# Patient Record
Sex: MALE | Race: BLACK OR AFRICAN AMERICAN | Employment: FULL TIME | URBAN - METROPOLITAN AREA
[De-identification: names, ages, dates, MRNs, and addresses within clinical notes are randomized per-mention and may not be internally consistent; named-entity substitution may affect disease eponyms.]

---

## 2018-08-05 ENCOUNTER — HOSPITAL ENCOUNTER (EMERGENCY)
Age: 38
Discharge: HOME | End: 2018-08-05
Attending: EMERGENCY MEDICINE
Payer: COMMERCIAL

## 2018-08-05 VITALS
HEART RATE: 90 BPM | DIASTOLIC BLOOD PRESSURE: 104 MMHG | WEIGHT: 145 LBS | HEIGHT: 71 IN | BODY MASS INDEX: 20.3 KG/M2 | OXYGEN SATURATION: 100 % | RESPIRATION RATE: 15 BRPM | SYSTOLIC BLOOD PRESSURE: 136 MMHG | TEMPERATURE: 97.9 F

## 2018-08-05 DIAGNOSIS — F10.920 ALCOHOLIC INTOXICATION WITHOUT COMPLICATION (HCC): ICD-10-CM

## 2018-08-05 DIAGNOSIS — S61.011A LACERATION OF RIGHT THUMB WITHOUT FOREIGN BODY WITHOUT DAMAGE TO NAIL, INITIAL ENCOUNTER: Primary | ICD-10-CM

## 2018-08-05 DIAGNOSIS — R55 VASOVAGAL NEAR-SYNCOPE: ICD-10-CM

## 2018-08-05 LAB
ALBUMIN SERPL-MCNC: 4 G/DL (ref 3.5–5)
ALBUMIN/GLOB SERPL: 1.3 {RATIO} (ref 1.1–2.2)
ALP SERPL-CCNC: 79 U/L (ref 45–117)
ALT SERPL-CCNC: 24 U/L (ref 12–78)
ANION GAP SERPL CALC-SCNC: 7 MMOL/L (ref 5–15)
AST SERPL-CCNC: 19 U/L (ref 15–37)
BASOPHILS # BLD: 0 K/UL (ref 0–0.1)
BASOPHILS NFR BLD: 0 % (ref 0–1)
BILIRUB SERPL-MCNC: 0.7 MG/DL (ref 0.2–1)
BUN SERPL-MCNC: 10 MG/DL (ref 6–20)
BUN/CREAT SERPL: 9 (ref 12–20)
CALCIUM SERPL-MCNC: 8.5 MG/DL (ref 8.5–10.1)
CHLORIDE SERPL-SCNC: 109 MMOL/L (ref 97–108)
CO2 SERPL-SCNC: 24 MMOL/L (ref 21–32)
CREAT SERPL-MCNC: 1.16 MG/DL (ref 0.7–1.3)
DIFFERENTIAL METHOD BLD: ABNORMAL
EOSINOPHIL # BLD: 0 K/UL (ref 0–0.4)
EOSINOPHIL NFR BLD: 0 % (ref 0–7)
ERYTHROCYTE [DISTWIDTH] IN BLOOD BY AUTOMATED COUNT: 13.5 % (ref 11.5–14.5)
ETHANOL SERPL-MCNC: 52 MG/DL
GLOBULIN SER CALC-MCNC: 3.1 G/DL (ref 2–4)
GLUCOSE SERPL-MCNC: 99 MG/DL (ref 65–100)
HCT VFR BLD AUTO: 41.3 % (ref 36.6–50.3)
HGB BLD-MCNC: 13.9 G/DL (ref 12.1–17)
IMM GRANULOCYTES # BLD: 0 K/UL (ref 0–0.04)
IMM GRANULOCYTES NFR BLD AUTO: 0 % (ref 0–0.5)
LYMPHOCYTES # BLD: 1 K/UL (ref 0.8–3.5)
LYMPHOCYTES NFR BLD: 8 % (ref 12–49)
MCH RBC QN AUTO: 31.4 PG (ref 26–34)
MCHC RBC AUTO-ENTMCNC: 33.7 G/DL (ref 30–36.5)
MCV RBC AUTO: 93.2 FL (ref 80–99)
MONOCYTES # BLD: 0.5 K/UL (ref 0–1)
MONOCYTES NFR BLD: 4 % (ref 5–13)
NEUTS SEG # BLD: 11.1 K/UL (ref 1.8–8)
NEUTS SEG NFR BLD: 88 % (ref 32–75)
NRBC # BLD: 0 K/UL (ref 0–0.01)
NRBC BLD-RTO: 0 PER 100 WBC
PLATELET # BLD AUTO: 240 K/UL (ref 150–400)
PMV BLD AUTO: 10.5 FL (ref 8.9–12.9)
POTASSIUM SERPL-SCNC: 4.1 MMOL/L (ref 3.5–5.1)
PROT SERPL-MCNC: 7.1 G/DL (ref 6.4–8.2)
RBC # BLD AUTO: 4.43 M/UL (ref 4.1–5.7)
SODIUM SERPL-SCNC: 140 MMOL/L (ref 136–145)
WBC # BLD AUTO: 12.6 K/UL (ref 4.1–11.1)

## 2018-08-05 PROCEDURE — 99284 EMERGENCY DEPT VISIT MOD MDM: CPT

## 2018-08-05 PROCEDURE — 85025 COMPLETE CBC W/AUTO DIFF WBC: CPT | Performed by: EMERGENCY MEDICINE

## 2018-08-05 PROCEDURE — 75810000293 HC SIMP/SUPERF WND  RPR

## 2018-08-05 PROCEDURE — 80307 DRUG TEST PRSMV CHEM ANLYZR: CPT | Performed by: EMERGENCY MEDICINE

## 2018-08-05 PROCEDURE — 77030031132 HC SUT NYL COVD -A

## 2018-08-05 PROCEDURE — 77030018836 HC SOL IRR NACL ICUM -A

## 2018-08-05 PROCEDURE — 96360 HYDRATION IV INFUSION INIT: CPT

## 2018-08-05 PROCEDURE — 74011250636 HC RX REV CODE- 250/636: Performed by: PHYSICIAN ASSISTANT

## 2018-08-05 PROCEDURE — 36415 COLL VENOUS BLD VENIPUNCTURE: CPT | Performed by: EMERGENCY MEDICINE

## 2018-08-05 PROCEDURE — 80053 COMPREHEN METABOLIC PANEL: CPT | Performed by: EMERGENCY MEDICINE

## 2018-08-05 PROCEDURE — 74011250636 HC RX REV CODE- 250/636: Performed by: EMERGENCY MEDICINE

## 2018-08-05 RX ORDER — LIDOCAINE HYDROCHLORIDE 10 MG/ML
5 INJECTION, SOLUTION EPIDURAL; INFILTRATION; INTRACAUDAL; PERINEURAL ONCE
Status: COMPLETED | OUTPATIENT
Start: 2018-08-05 | End: 2018-08-05

## 2018-08-05 RX ADMIN — SODIUM CHLORIDE 1000 ML: 900 INJECTION, SOLUTION INTRAVENOUS at 14:43

## 2018-08-05 RX ADMIN — LIDOCAINE HYDROCHLORIDE 5 ML: 10 INJECTION, SOLUTION EPIDURAL; INFILTRATION; INTRACAUDAL; PERINEURAL at 16:26

## 2018-08-05 NOTE — LETTER
[address] 
\[facility]\"" EMERGENCY DEPT 
[address] [id number] 
[phone] Work/School Note Date: 8/5/2018 To Whom It May concern: 
 
Adrianna Birmingham was seen and treated today in the emergency room by the following provider(s): 
No providers found. Kate Samano MD 
 
Adrianna Birmingham may return to work on Wednesday 08/08/2018 Sincerely, 
 
 
 
 
Juan Miguel Mcdonald

## 2018-08-05 NOTE — ED PROVIDER NOTES
EMERGENCY DEPARTMENT HISTORY AND PHYSICAL EXAM      Date: 8/5/2018  Patient Name: Michelle Graevs    History of Presenting Illness     Chief Complaint   Patient presents with    Laceration     to left second finger. History Provided By: Patient and Patient's Wife    HPI: Michelle Graves, 43 y.o. male with PMHx significant for HTN, presents ambulatory to the ED with cc of new onset, acute left index finger s/p v-shaped laceration while using a cutting knife earlier this afternoon. The pt reports secondary light-headedness and dizziness that immediately followed the laceration. He states he has not eaten today, but had 2 beers to drink. He denies any drug use. The pt conveys he is currently prescribed medication for HTN, but has not taken any medication today. Pt denies light-headedness prior to cutting his finger. He states his tetanus is UTD. He specifically denies any CP, N/V and difficulty breathing. Chief Complaint: Laceration   Duration: Few Hours  Timing:  Acute  Location: Left index finger  Quality: Stabbing  Severity: Moderate  Associated Symptoms: Light-heaedness, dizziness    There are no other complaints, changes, or physical findings at this time. PCP: None    Past History     Past Medical History:  No past medical history on file. Past Surgical History:  No past surgical history on file. Family History:  No family history on file. Social History:  Social History   Substance Use Topics    Smoking status: Not on file    Smokeless tobacco: Not on file    Alcohol use Not on file       Allergies:  No Known Allergies      Review of Systems   Review of Systems   Constitutional: Positive for diaphoresis. Negative for chills, fatigue and fever. HENT: Negative for congestion and rhinorrhea. Eyes: Negative for visual disturbance. Respiratory: Negative for cough, shortness of breath and wheezing. Cardiovascular: Negative for chest pain and palpitations.    Gastrointestinal: Negative for abdominal distention, abdominal pain, constipation, diarrhea, nausea and vomiting. Endocrine: Negative. Genitourinary: Negative for difficulty urinating and dysuria. Musculoskeletal: Negative. Skin: Negative for rash. Neurological: Positive for dizziness and light-headedness. Negative for weakness. Psychiatric/Behavioral: Negative for suicidal ideas. Physical Exam   Physical Exam   Constitutional: He is oriented to person, place, and time. He appears well-developed and well-nourished. No distress. HENT:   Head: Normocephalic and atraumatic. Mouth/Throat: Oropharynx is clear and moist.   Eyes: Conjunctivae and EOM are normal.   Neck: Neck supple. No JVD present. No tracheal deviation present. Cardiovascular: Normal rate, regular rhythm and intact distal pulses. Exam reveals no gallop and no friction rub. No murmur heard. Pulmonary/Chest: Effort normal and breath sounds normal. No stridor. No respiratory distress. He has no wheezes. Abdominal: Soft. Bowel sounds are normal. He exhibits no distension and no mass. There is no tenderness. There is no guarding. Musculoskeletal: Normal range of motion. He exhibits no edema or tenderness. V-shaped laceration 1 cm in diameter to L lateral distal index finger. Active bleeding superficially. No obvious foreign bodies. Neurological: He is alert and oriented to person, place, and time. He has normal strength. No focal deficits   Skin: Skin is warm, dry and intact. No rash noted. Psychiatric: He has a normal mood and affect. His behavior is normal. Judgment and thought content normal.   Nursing note and vitals reviewed.       Diagnostic Study Results     Labs -  Recent Results (from the past 12 hour(s))   METABOLIC PANEL, COMPREHENSIVE    Collection Time: 08/05/18  3:50 PM   Result Value Ref Range    Sodium 140 136 - 145 mmol/L    Potassium 4.1 3.5 - 5.1 mmol/L    Chloride 109 (H) 97 - 108 mmol/L    CO2 24 21 - 32 mmol/L Anion gap 7 5 - 15 mmol/L    Glucose 99 65 - 100 mg/dL    BUN 10 6 - 20 MG/DL    Creatinine 1.16 0.70 - 1.30 MG/DL    BUN/Creatinine ratio 9 (L) 12 - 20      GFR est AA >60 >60 ml/min/1.73m2    GFR est non-AA >60 >60 ml/min/1.73m2    Calcium 8.5 8.5 - 10.1 MG/DL    Bilirubin, total 0.7 0.2 - 1.0 MG/DL    ALT (SGPT) 24 12 - 78 U/L    AST (SGOT) 19 15 - 37 U/L    Alk. phosphatase 79 45 - 117 U/L    Protein, total 7.1 6.4 - 8.2 g/dL    Albumin 4.0 3.5 - 5.0 g/dL    Globulin 3.1 2.0 - 4.0 g/dL    A-G Ratio 1.3 1.1 - 2.2     CBC WITH AUTOMATED DIFF    Collection Time: 08/05/18  3:50 PM   Result Value Ref Range    WBC 12.6 (H) 4.1 - 11.1 K/uL    RBC 4.43 4.10 - 5.70 M/uL    HGB 13.9 12.1 - 17.0 g/dL    HCT 41.3 36.6 - 50.3 %    MCV 93.2 80.0 - 99.0 FL    MCH 31.4 26.0 - 34.0 PG    MCHC 33.7 30.0 - 36.5 g/dL    RDW 13.5 11.5 - 14.5 %    PLATELET 407 099 - 897 K/uL    MPV 10.5 8.9 - 12.9 FL    NRBC 0.0 0  WBC    ABSOLUTE NRBC 0.00 0.00 - 0.01 K/uL    NEUTROPHILS 88 (H) 32 - 75 %    LYMPHOCYTES 8 (L) 12 - 49 %    MONOCYTES 4 (L) 5 - 13 %    EOSINOPHILS 0 0 - 7 %    BASOPHILS 0 0 - 1 %    IMMATURE GRANULOCYTES 0 0.0 - 0.5 %    ABS. NEUTROPHILS 11.1 (H) 1.8 - 8.0 K/UL    ABS. LYMPHOCYTES 1.0 0.8 - 3.5 K/UL    ABS. MONOCYTES 0.5 0.0 - 1.0 K/UL    ABS. EOSINOPHILS 0.0 0.0 - 0.4 K/UL    ABS. BASOPHILS 0.0 0.0 - 0.1 K/UL    ABS. IMM. GRANS. 0.0 0.00 - 0.04 K/UL    DF AUTOMATED     ETHYL ALCOHOL    Collection Time: 08/05/18  3:50 PM   Result Value Ref Range    ALCOHOL(ETHYL),SERUM 52 (H) <10 MG/DL       Medical Decision Making   I am the first provider for this patient. I reviewed the vital signs, available nursing notes, past medical history, past surgical history, family history and social history. Vital Signs-Reviewed the patient's vital signs.   Patient Vitals for the past 12 hrs:   Temp Pulse Resp BP SpO2   08/05/18 1659 - 90 15 (!) 136/104 100 %   08/05/18 1645 - 74 15 (!) 151/95 -   08/05/18 1630 - 81 16 (!) 144/100 -   08/05/18 1600 - 85 16 (!) 138/96 -   08/05/18 1530 - 96 14 (!) 135/95 -   08/05/18 1500 - 86 21 - 100 %   08/05/18 1430 - 67 17 117/78 100 %   08/05/18 1420 - 74 21 - 100 %   08/05/18 1416 - 73 21 - 99 %   08/05/18 1415 - 82 21 96/63 100 %   08/05/18 1412 - 74 18 - 100 %   08/05/18 1408 - - - 100/69 99 %   08/05/18 1354 - (!) 47 - (!) 62/40 99 %   08/05/18 1352 97.9 °F (36.6 °C) (!) 53 - (!) 72/44 100 %       Pulse Oximetry Analysis - 100% on RA    Records Reviewed: Nursing Notes and Old Medical Records    Provider Notes (Medical Decision Making):   DDx: Vasovagal, orthostatic hypotension, alc intox, dehyd, elec abnor, anemia    ED Course:   Initial assessment performed. The patients presenting problems have been discussed, and they are in agreement with the care plan formulated and outlined with them. I have encouraged them to ask questions as they arise throughout their visit. Procedure Note - Laceration Repair:  4:39 PM  Procedure by Elizabeth Rodriguez PA-C. Complexity: Simple  1.5 cm linear laceration to distal left second finger was irrigated copiously with NS under jet lavage, prepped with Hibiclens and draped in a sterile fashion. The area was anesthetized with 5 mLs of  Lidocaine 1% without epinephrine via digital block. The wound was explored with the following results: No foreign bodies found. The wound was repaired with One layer suture closure: Skin Layer:  3 sutures placed, stitch type:simple interrupted, suture: 5-0 nylon. .  The wound was closed with good hemostasis and approximation. Sterile dressing applied. Estimated blood loss: < 5 mL  The procedure took 20 minutes, and pt tolerated well.    5:05 PM  Pt reports he is feeling better on re-evaluation. Pt is ambulating without difficulty. Blood pressure has returned to normal. Pt is ready to be discharged. Disposition:  Discharge Note:  5:07 PM  The pt is ready for discharge.  The pt's signs, symptoms, diagnosis, and discharge instructions have been discussed and pt has conveyed their understanding. The pt is to follow up as recommended or return to ER should their symptoms worsen. Plan has been discussed and pt is in agreement. PLAN:  1. Discharge  Follow-up Information     Follow up With Details Comments Contact Info    Your PCP Schedule an appointment as soon as possible for a visit      [facility] EMERGENCY DEPT  As needed, If symptoms worsen [address] [place] [address]  [phone]        Return to ED if worse     Diagnosis     Clinical Impression:   1. Laceration of right thumb without foreign body without damage to nail, initial encounter    2. Vasovagal near-syncope    3. Alcoholic intoxication without complication ([address]        Attestations: This note is prepared by Kaylee Huerta, acting as Scribe for [address], DO: The scribe's documentation has been prepared under my direction and personally reviewed by me in its entirety. I confirm that the note above accurately reflects all work, treatment, procedures, and medical decision making performed by me.

## 2018-08-05 NOTE — ED NOTES
Complaint of laceration to tip of left 2nd finger. After the injury, the pt became weak and diaphoretic. On arrival to the ED, the patient's BP was low x 2. Pt placed on monitor x 3.   Finger is bandaged with gauze covered by electrical tape

## 2018-08-05 NOTE — ED NOTES
Ambulated in the hallway to the refridg to get the pt some adrian and linden moss  Checked bp standing   Pt normally has high blood pressure and hasn't been taking his meds  Pt reports feeling better.

## 2018-08-05 NOTE — DISCHARGE INSTRUCTIONS
Cuts on the Hand Closed With Stitches: Care Instructions  Your Care Instructions    A cut on your hand can be on your fingers, your thumb, or the front or back of your hand. Sometimes a cut can injure the tendons, blood vessels, or nerves of your hand. The doctor used stitches to close the cut. Using stitches also helps the cut heal and reduces scarring. The doctor may have given you a splint to help prevent you from moving your hand, fingers, or thumb. If the cut went deep and through the skin, the doctor put in two layers of stitches. The deeper layer brings the deep part of the cut together. These stitches will dissolve and don't need to be removed. The stitches in the upper layer are the ones you see on the cut. You will probably have a bandage. You will need to have the stitches removed, usually in 7 to 14 days. The doctor may suggest that you see a hand specialist if the cut is very deep or if you have trouble moving your fingers or have less feeling in your hand. The doctor has checked you carefully, but problems can develop later. If you notice any problems or new symptoms, get medical treatment right away. Follow-up care is a key part of your treatment and safety. Be sure to make and go to all appointments, and call your doctor if you are having problems. It's also a good idea to know your test results and keep a list of the medicines you take. How can you care for yourself at home? · Keep the cut dry for the first 24 to 48 hours. After this, you can shower if your doctor okays it. Pat the cut dry. · Don't soak the cut, such as in a bathtub. Your doctor will tell you when it's safe to get the cut wet. · If your doctor told you how to care for your cut, follow your doctor's instructions. If you did not get instructions, follow this general advice:  ¨ After the first 24 to 48 hours, wash around the cut with clean water 2 times a day.  Don't use hydrogen peroxide or alcohol, which can slow healing. ¨ You may cover the cut with a thin layer of petroleum jelly, such as Vaseline, and a nonstick bandage. ¨ Apply more petroleum jelly and replace the bandage as needed. · Prop up the sore hand on a pillow anytime you sit or lie down during the next 3 days. Try to keep it above the level of your heart. This will help reduce swelling. · Avoid any activity that could cause your cut to reopen. · Do not remove the stitches on your own. Your doctor will tell you when to come back to have the stitches removed. · Be safe with medicines. Take pain medicines exactly as directed. ¨ If the doctor gave you a prescription medicine for pain, take it as prescribed. ¨ If you are not taking a prescription pain medicine, ask your doctor if you can take an over-the-counter medicine. When should you call for help? Call your doctor now or seek immediate medical care if:    · You have new pain, or your pain gets worse.     · The skin near the cut is cold or pale or changes color.     · You have tingling, weakness, or numbness near the cut.     · The cut starts to bleed, and blood soaks through the bandage. Oozing small amounts of blood is normal.     · You have trouble moving the area of the hand near the cut.     · You have symptoms of infection, such as:  ¨ Increased pain, swelling, warmth, or redness around the cut. ¨ Red streaks leading from the cut. ¨ Pus draining from the cut. ¨ A fever.    Watch closely for changes in your health, and be sure to contact your doctor if:    · You do not get better as expected. Where can you learn more? Go to http://oma-elizabeth.info/. Enter T250 in the search box to learn more about \"Cuts on the Hand Closed With Stitches: Care Instructions. \"  Current as of: November 20, 2017  Content Version: 11.7  © 1725-4097 [employer], [employer].  Care instructions adapted under license by [employer] (which disclaims liability or warranty for this information). If you have questions about a medical condition or this instruction, always ask your healthcare professional. [address] any warranty or liability for your use of this information. Vasovagal Syncope: Care Instructions  Your Care Instructions    Vasovagal syncope (say \"uxh-hef-IYT-chandan ROLLINSGXUQ-tai-kdy\")is sudden dizziness or fainting that can be set off by things such as pain, stress, fear, or trauma. You may sweat or feel lightheaded, sick to your stomach, or tingly. The problem causes the heart rate to slow and the blood vessels to widen, or dilate, for a short time. When this happens, blood pools in the lower body, and less blood goes to the brain. You can usually get relief by lying down with your legs raised (elevated). This helps more blood to flow to your brain and may help relieve symptoms like feeling dizzy. Some doctors may recommend a technique that involves tensing your fists and arms. This type of fainting is often easy to predict. For example, it happens to some people when they see blood or have to get a shot. They may feel symptoms before they faint. An episode of vasovagal syncope usually responds well to self-care. Other treatment often isn't needed. But if the fainting keeps happening, your doctor may suggest further treatments. Follow-up care is a key part of your treatment and safety. Be sure to make and go to all appointments, and call your doctor if you are having problems. It's also a good idea to know your test results and keep a list of the medicines you take. How can you care for yourself at home? · Drink plenty of fluids to prevent dehydration. If you have kidney, heart, or liver disease and have to limit fluids, talk with your doctor before you increase your fluid intake. · Try to avoid things that you think may set off vasovagal syncope. · Talk to your doctor about any medicines you take.  Some medicines may increase the chance of this condition occurring. · If you feel symptoms, lie down with your legs raised. Talk to your doctor about what to do if your symptoms come back. When should you call for help? Call 911 anytime you think you may need emergency care. For example, call if:    · You have symptoms of a heart problem. These may include:  ¨ Chest pain or pressure. ¨ Severe trouble breathing. ¨ A fast or irregular heartbeat.    Watch closely for changes in your health, and be sure to contact your doctor if:    · You have more episodes of fainting at home.     · You do not get better as expected. Where can you learn more? Go to http://oma-elizabeth.info/. Enter L754 in the search box to learn more about \"Vasovagal Syncope: Care Instructions. \"  Current as of: November 20, 2017  Content Version: 11.7  © 1972-9398 [employer]. Care instructions adapted under license by [employer] (which disclaims liability or warranty for this information). If you have questions about a medical condition or this instruction, always ask your healthcare professional. [address] any warranty or liability for your use of this information.